# Patient Record
Sex: FEMALE | Race: WHITE | HISPANIC OR LATINO
[De-identification: names, ages, dates, MRNs, and addresses within clinical notes are randomized per-mention and may not be internally consistent; named-entity substitution may affect disease eponyms.]

---

## 2017-05-01 ENCOUNTER — TRANSCRIPTION ENCOUNTER (OUTPATIENT)
Age: 42
End: 2017-05-01

## 2017-12-10 ENCOUNTER — LABORATORY RESULT (OUTPATIENT)
Age: 42
End: 2017-12-10

## 2017-12-11 ENCOUNTER — APPOINTMENT (OUTPATIENT)
Dept: OBGYN | Facility: CLINIC | Age: 42
End: 2017-12-11
Payer: MEDICAID

## 2017-12-11 ENCOUNTER — OUTPATIENT (OUTPATIENT)
Dept: OUTPATIENT SERVICES | Facility: HOSPITAL | Age: 42
LOS: 1 days | End: 2017-12-11
Payer: MEDICAID

## 2017-12-11 VITALS
WEIGHT: 141 LBS | SYSTOLIC BLOOD PRESSURE: 130 MMHG | BODY MASS INDEX: 25.95 KG/M2 | DIASTOLIC BLOOD PRESSURE: 90 MMHG | HEIGHT: 62 IN

## 2017-12-11 DIAGNOSIS — R10.2 PELVIC AND PERINEAL PAIN: ICD-10-CM

## 2017-12-11 DIAGNOSIS — N76.0 ACUTE VAGINITIS: ICD-10-CM

## 2017-12-11 PROCEDURE — G0463: CPT

## 2017-12-11 PROCEDURE — 99213 OFFICE O/P EST LOW 20 MIN: CPT | Mod: GC

## 2017-12-11 RX ORDER — NORGESTIMATE AND ETHINYL ESTRADIOL 0.25-0.035
0.25-35 KIT ORAL DAILY
Qty: 90 | Refills: 3 | Status: DISCONTINUED | COMMUNITY
Start: 2017-12-11 | End: 2017-12-11

## 2017-12-12 ENCOUNTER — APPOINTMENT (OUTPATIENT)
Dept: MAMMOGRAPHY | Facility: IMAGING CENTER | Age: 42
End: 2017-12-12

## 2017-12-12 ENCOUNTER — APPOINTMENT (OUTPATIENT)
Dept: ULTRASOUND IMAGING | Facility: IMAGING CENTER | Age: 42
End: 2017-12-12

## 2017-12-12 LAB
C TRACH RRNA SPEC QL NAA+PROBE: SIGNIFICANT CHANGE UP
CANDIDA AB TITR SER: SIGNIFICANT CHANGE UP
G VAGINALIS DNA SPEC QL NAA+PROBE: DETECTED
N GONORRHOEA RRNA SPEC QL NAA+PROBE: SIGNIFICANT CHANGE UP
SPECIMEN SOURCE: SIGNIFICANT CHANGE UP
T VAGINALIS SPEC QL WET PREP: SIGNIFICANT CHANGE UP

## 2017-12-19 DIAGNOSIS — R10.2 PELVIC AND PERINEAL PAIN: ICD-10-CM

## 2017-12-20 ENCOUNTER — FORM ENCOUNTER (OUTPATIENT)
Age: 42
End: 2017-12-20

## 2017-12-21 ENCOUNTER — OUTPATIENT (OUTPATIENT)
Dept: OUTPATIENT SERVICES | Facility: HOSPITAL | Age: 42
LOS: 1 days | End: 2017-12-21
Payer: MEDICAID

## 2017-12-21 ENCOUNTER — APPOINTMENT (OUTPATIENT)
Dept: ULTRASOUND IMAGING | Facility: IMAGING CENTER | Age: 42
End: 2017-12-21
Payer: MEDICAID

## 2017-12-21 ENCOUNTER — APPOINTMENT (OUTPATIENT)
Dept: MAMMOGRAPHY | Facility: IMAGING CENTER | Age: 42
End: 2017-12-21
Payer: MEDICAID

## 2017-12-21 DIAGNOSIS — Z00.8 ENCOUNTER FOR OTHER GENERAL EXAMINATION: ICD-10-CM

## 2017-12-21 PROCEDURE — G0204: CPT | Mod: 26

## 2017-12-21 PROCEDURE — 76641 ULTRASOUND BREAST COMPLETE: CPT | Mod: 26,50

## 2017-12-21 PROCEDURE — G0279: CPT | Mod: 26

## 2017-12-21 PROCEDURE — 76641 ULTRASOUND BREAST COMPLETE: CPT

## 2017-12-21 PROCEDURE — 77066 DX MAMMO INCL CAD BI: CPT

## 2017-12-21 PROCEDURE — G0279: CPT

## 2017-12-26 ENCOUNTER — OTHER (OUTPATIENT)
Age: 42
End: 2017-12-26

## 2017-12-26 DIAGNOSIS — N63.10 UNSPECIFIED LUMP IN THE RIGHT BREAST, UNSPECIFIED QUADRANT: ICD-10-CM

## 2018-01-03 ENCOUNTER — FORM ENCOUNTER (OUTPATIENT)
Age: 43
End: 2018-01-03

## 2018-01-04 ENCOUNTER — RESULT REVIEW (OUTPATIENT)
Age: 43
End: 2018-01-04

## 2018-01-04 ENCOUNTER — APPOINTMENT (OUTPATIENT)
Dept: ULTRASOUND IMAGING | Facility: IMAGING CENTER | Age: 43
End: 2018-01-04
Payer: MEDICAID

## 2018-01-04 ENCOUNTER — OUTPATIENT (OUTPATIENT)
Dept: OUTPATIENT SERVICES | Facility: HOSPITAL | Age: 43
LOS: 1 days | End: 2018-01-04
Payer: MEDICAID

## 2018-01-04 DIAGNOSIS — N63.10 UNSPECIFIED LUMP IN THE RIGHT BREAST, UNSPECIFIED QUADRANT: ICD-10-CM

## 2018-01-04 PROCEDURE — A4648: CPT

## 2018-01-04 PROCEDURE — 77065 DX MAMMO INCL CAD UNI: CPT

## 2018-01-04 PROCEDURE — 19084 BX BREAST ADD LESION US IMAG: CPT

## 2018-01-04 PROCEDURE — 19083 BX BREAST 1ST LESION US IMAG: CPT

## 2018-01-04 PROCEDURE — 19084 BX BREAST ADD LESION US IMAG: CPT | Mod: RT

## 2018-01-04 PROCEDURE — 19083 BX BREAST 1ST LESION US IMAG: CPT | Mod: RT

## 2018-01-04 PROCEDURE — 77065 DX MAMMO INCL CAD UNI: CPT | Mod: 26,RT

## 2018-01-08 ENCOUNTER — MOBILE ON CALL (OUTPATIENT)
Age: 43
End: 2018-01-08

## 2018-01-30 ENCOUNTER — APPOINTMENT (OUTPATIENT)
Dept: SURGICAL ONCOLOGY | Facility: CLINIC | Age: 43
End: 2018-01-30
Payer: MEDICAID

## 2018-01-30 VITALS
SYSTOLIC BLOOD PRESSURE: 104 MMHG | OXYGEN SATURATION: 97 % | HEART RATE: 74 BPM | BODY MASS INDEX: 26.68 KG/M2 | HEIGHT: 62 IN | TEMPERATURE: 98 F | DIASTOLIC BLOOD PRESSURE: 65 MMHG | WEIGHT: 145 LBS

## 2018-01-30 PROCEDURE — 99204 OFFICE O/P NEW MOD 45 MIN: CPT

## 2018-02-08 ENCOUNTER — OUTPATIENT (OUTPATIENT)
Dept: OUTPATIENT SERVICES | Facility: HOSPITAL | Age: 43
LOS: 1 days | End: 2018-02-08
Payer: MEDICAID

## 2018-02-08 VITALS
WEIGHT: 143.96 LBS | RESPIRATION RATE: 16 BRPM | DIASTOLIC BLOOD PRESSURE: 72 MMHG | TEMPERATURE: 98 F | HEIGHT: 62 IN | SYSTOLIC BLOOD PRESSURE: 116 MMHG | OXYGEN SATURATION: 99 % | HEART RATE: 77 BPM

## 2018-02-08 DIAGNOSIS — Z98.890 OTHER SPECIFIED POSTPROCEDURAL STATES: Chronic | ICD-10-CM

## 2018-02-08 DIAGNOSIS — D24.1 BENIGN NEOPLASM OF RIGHT BREAST: ICD-10-CM

## 2018-02-08 DIAGNOSIS — Z01.818 ENCOUNTER FOR OTHER PREPROCEDURAL EXAMINATION: ICD-10-CM

## 2018-02-08 LAB
ANION GAP SERPL CALC-SCNC: 12 MMOL/L — SIGNIFICANT CHANGE UP (ref 5–17)
BUN SERPL-MCNC: 11 MG/DL — SIGNIFICANT CHANGE UP (ref 7–23)
CALCIUM SERPL-MCNC: 9 MG/DL — SIGNIFICANT CHANGE UP (ref 8.4–10.5)
CHLORIDE SERPL-SCNC: 107 MMOL/L — SIGNIFICANT CHANGE UP (ref 96–108)
CO2 SERPL-SCNC: 23 MMOL/L — SIGNIFICANT CHANGE UP (ref 22–31)
CREAT SERPL-MCNC: 0.59 MG/DL — SIGNIFICANT CHANGE UP (ref 0.5–1.3)
GLUCOSE SERPL-MCNC: 72 MG/DL — SIGNIFICANT CHANGE UP (ref 70–99)
HCT VFR BLD CALC: 39.6 % — SIGNIFICANT CHANGE UP (ref 34.5–45)
HGB BLD-MCNC: 13.2 G/DL — SIGNIFICANT CHANGE UP (ref 11.5–15.5)
MCHC RBC-ENTMCNC: 29.6 PG — SIGNIFICANT CHANGE UP (ref 27–34)
MCHC RBC-ENTMCNC: 33.3 GM/DL — SIGNIFICANT CHANGE UP (ref 32–36)
MCV RBC AUTO: 88.8 FL — SIGNIFICANT CHANGE UP (ref 80–100)
PLATELET # BLD AUTO: 290 K/UL — SIGNIFICANT CHANGE UP (ref 150–400)
POTASSIUM SERPL-MCNC: 4.5 MMOL/L — SIGNIFICANT CHANGE UP (ref 3.5–5.3)
POTASSIUM SERPL-SCNC: 4.5 MMOL/L — SIGNIFICANT CHANGE UP (ref 3.5–5.3)
RBC # BLD: 4.46 M/UL — SIGNIFICANT CHANGE UP (ref 3.8–5.2)
RBC # FLD: 13.1 % — SIGNIFICANT CHANGE UP (ref 10.3–14.5)
SODIUM SERPL-SCNC: 142 MMOL/L — SIGNIFICANT CHANGE UP (ref 135–145)
WBC # BLD: 5.89 K/UL — SIGNIFICANT CHANGE UP (ref 3.8–10.5)
WBC # FLD AUTO: 5.89 K/UL — SIGNIFICANT CHANGE UP (ref 3.8–10.5)

## 2018-02-08 PROCEDURE — G0463: CPT

## 2018-02-08 PROCEDURE — 85027 COMPLETE CBC AUTOMATED: CPT

## 2018-02-08 PROCEDURE — 80048 BASIC METABOLIC PNL TOTAL CA: CPT

## 2018-02-08 RX ORDER — LIDOCAINE HCL 20 MG/ML
0.2 VIAL (ML) INJECTION ONCE
Qty: 0 | Refills: 0 | Status: DISCONTINUED | OUTPATIENT
Start: 2018-02-14 | End: 2018-03-01

## 2018-02-08 RX ORDER — APREPITANT 80 MG/1
40 CAPSULE ORAL ONCE
Qty: 0 | Refills: 0 | Status: COMPLETED | OUTPATIENT
Start: 2018-02-14 | End: 2018-02-14

## 2018-02-08 RX ORDER — ACETAMINOPHEN 500 MG
1000 TABLET ORAL ONCE
Qty: 0 | Refills: 0 | Status: COMPLETED | OUTPATIENT
Start: 2018-02-14 | End: 2018-02-14

## 2018-02-08 RX ORDER — SODIUM CHLORIDE 9 MG/ML
3 INJECTION INTRAMUSCULAR; INTRAVENOUS; SUBCUTANEOUS EVERY 8 HOURS
Qty: 0 | Refills: 0 | Status: DISCONTINUED | OUTPATIENT
Start: 2018-02-14 | End: 2018-03-01

## 2018-02-08 NOTE — H&P PST ADULT - HISTORY OF PRESENT ILLNESS
This is a 43 y/o female with no significant PMH. Current dx: Right Breast Intraductal Papilloma. Scheduled: Right Breast Excisional Biopsy Post VELVET  Local.

## 2018-02-08 NOTE — H&P PST ADULT - PMH
Borderline hypercholesterolemia  Diet-managed  Migraine    Multiparity Borderline hypercholesterolemia  Diet-managed  Intraductal papilloma of breast, right  dx: 12/2017  Migraine    Multiparity

## 2018-02-08 NOTE — H&P PST ADULT - ATTENDING COMMENTS
Risks, benefits, and alternatives discussed with patient.  She expressed understanding and agrees to proceed with right breast excisional biopsy with VELVET  localization.

## 2018-02-08 NOTE — H&P PST ADULT - NSANTHOSAYNRD_GEN_A_CORE
No. DOMINGUEZ screening performed.  STOP BANG Legend: 0-2 = LOW Risk; 3-4 = INTERMEDIATE Risk; 5-8 = HIGH Risk

## 2018-02-11 ENCOUNTER — FORM ENCOUNTER (OUTPATIENT)
Age: 43
End: 2018-02-11

## 2018-02-12 ENCOUNTER — OUTPATIENT (OUTPATIENT)
Dept: OUTPATIENT SERVICES | Facility: HOSPITAL | Age: 43
LOS: 1 days | End: 2018-02-12
Payer: MEDICAID

## 2018-02-12 ENCOUNTER — APPOINTMENT (OUTPATIENT)
Dept: ULTRASOUND IMAGING | Facility: IMAGING CENTER | Age: 43
End: 2018-02-12
Payer: MEDICAID

## 2018-02-12 DIAGNOSIS — Z98.890 OTHER SPECIFIED POSTPROCEDURAL STATES: Chronic | ICD-10-CM

## 2018-02-12 DIAGNOSIS — Z00.8 ENCOUNTER FOR OTHER GENERAL EXAMINATION: ICD-10-CM

## 2018-02-12 PROCEDURE — 19285 PERQ DEV BREAST 1ST US IMAG: CPT | Mod: RT

## 2018-02-12 PROCEDURE — C1739: CPT

## 2018-02-12 PROCEDURE — 19285 PERQ DEV BREAST 1ST US IMAG: CPT

## 2018-02-13 ENCOUNTER — FORM ENCOUNTER (OUTPATIENT)
Age: 43
End: 2018-02-13

## 2018-02-14 ENCOUNTER — RESULT REVIEW (OUTPATIENT)
Age: 43
End: 2018-02-14

## 2018-02-14 ENCOUNTER — OUTPATIENT (OUTPATIENT)
Dept: OUTPATIENT SERVICES | Facility: HOSPITAL | Age: 43
LOS: 1 days | End: 2018-02-14
Payer: MEDICAID

## 2018-02-14 ENCOUNTER — TRANSCRIPTION ENCOUNTER (OUTPATIENT)
Age: 43
End: 2018-02-14

## 2018-02-14 ENCOUNTER — APPOINTMENT (OUTPATIENT)
Dept: SURGICAL ONCOLOGY | Facility: HOSPITAL | Age: 43
End: 2018-02-14

## 2018-02-14 VITALS
DIASTOLIC BLOOD PRESSURE: 67 MMHG | HEART RATE: 76 BPM | SYSTOLIC BLOOD PRESSURE: 115 MMHG | OXYGEN SATURATION: 97 % | TEMPERATURE: 98 F | RESPIRATION RATE: 18 BRPM

## 2018-02-14 VITALS
RESPIRATION RATE: 16 BRPM | HEIGHT: 62 IN | HEART RATE: 77 BPM | WEIGHT: 143.96 LBS | SYSTOLIC BLOOD PRESSURE: 108 MMHG | TEMPERATURE: 98 F | DIASTOLIC BLOOD PRESSURE: 70 MMHG | OXYGEN SATURATION: 98 %

## 2018-02-14 DIAGNOSIS — G89.18 OTHER ACUTE POSTPROCEDURAL PAIN: ICD-10-CM

## 2018-02-14 DIAGNOSIS — Z98.890 OTHER SPECIFIED POSTPROCEDURAL STATES: Chronic | ICD-10-CM

## 2018-02-14 DIAGNOSIS — D24.1 BENIGN NEOPLASM OF RIGHT BREAST: ICD-10-CM

## 2018-02-14 PROCEDURE — 19281 PERQ DEVICE BREAST 1ST IMAG: CPT

## 2018-02-14 PROCEDURE — 19366: CPT | Mod: 59

## 2018-02-14 PROCEDURE — 76098 X-RAY EXAM SURGICAL SPECIMEN: CPT

## 2018-02-14 PROCEDURE — 88307 TISSUE EXAM BY PATHOLOGIST: CPT

## 2018-02-14 PROCEDURE — 76098 X-RAY EXAM SURGICAL SPECIMEN: CPT | Mod: 26

## 2018-02-14 PROCEDURE — 19125 EXCISION BREAST LESION: CPT

## 2018-02-14 PROCEDURE — 88307 TISSUE EXAM BY PATHOLOGIST: CPT | Mod: 26

## 2018-02-14 PROCEDURE — 19125 EXCISION BREAST LESION: CPT | Mod: RT

## 2018-02-14 RX ORDER — CELECOXIB 200 MG/1
200 CAPSULE ORAL ONCE
Qty: 0 | Refills: 0 | Status: COMPLETED | OUTPATIENT
Start: 2018-02-14 | End: 2018-02-14

## 2018-02-14 RX ORDER — CELECOXIB 200 MG/1
200 CAPSULE ORAL ONCE
Qty: 0 | Refills: 0 | Status: DISCONTINUED | OUTPATIENT
Start: 2018-02-14 | End: 2018-03-01

## 2018-02-14 RX ORDER — ONDANSETRON 8 MG/1
4 TABLET, FILM COATED ORAL ONCE
Qty: 0 | Refills: 0 | Status: DISCONTINUED | OUTPATIENT
Start: 2018-02-14 | End: 2018-03-01

## 2018-02-14 RX ORDER — OXYCODONE HYDROCHLORIDE 5 MG/1
10 TABLET ORAL ONCE
Qty: 0 | Refills: 0 | Status: DISCONTINUED | OUTPATIENT
Start: 2018-02-14 | End: 2018-02-14

## 2018-02-14 RX ADMIN — Medication 1000 MILLIGRAM(S): at 06:24

## 2018-02-14 RX ADMIN — APREPITANT 40 MILLIGRAM(S): 80 CAPSULE ORAL at 06:24

## 2018-02-14 RX ADMIN — CELECOXIB 200 MILLIGRAM(S): 200 CAPSULE ORAL at 06:24

## 2018-02-14 NOTE — BRIEF OPERATIVE NOTE - OPERATION/FINDINGS
Excisional biopsy with sly  localization of right breast mass, preoperative core needle biopsy showing intraductal papilloma

## 2018-02-14 NOTE — BRIEF OPERATIVE NOTE - PROCEDURE
<<-----Click on this checkbox to enter Procedure Excisional biopsy of right breast  02/14/2018    Active  [id number]

## 2018-02-14 NOTE — ASU PATIENT PROFILE, ADULT - PMH
Borderline hypercholesterolemia  Diet-managed  Intraductal papilloma of breast, right  dx: 12/2017  Migraine    Multiparity

## 2018-02-14 NOTE — ASU DISCHARGE PLAN (ADULT/PEDIATRIC). - ITEMS TO FOLLOWUP WITH YOUR PHYSICIAN'S
Please follow up with Dr. Ovalles in 2 weeks. You can call his office at [phone].  Take tylenol and motrin for pain.  You can shower tomorrow. No tub baths.  Wear surgical bra for 48 hours. After than you can return to wearing your regular bra.

## 2018-03-01 ENCOUNTER — APPOINTMENT (OUTPATIENT)
Dept: SURGICAL ONCOLOGY | Facility: CLINIC | Age: 43
End: 2018-03-01
Payer: MEDICAID

## 2018-03-01 VITALS
OXYGEN SATURATION: 100 % | TEMPERATURE: 98 F | DIASTOLIC BLOOD PRESSURE: 73 MMHG | SYSTOLIC BLOOD PRESSURE: 137 MMHG | BODY MASS INDEX: 26.87 KG/M2 | HEART RATE: 69 BPM | WEIGHT: 146 LBS | HEIGHT: 62 IN

## 2018-03-01 DIAGNOSIS — D24.1 BENIGN NEOPLASM OF RIGHT BREAST: ICD-10-CM

## 2018-03-01 PROCEDURE — 99024 POSTOP FOLLOW-UP VISIT: CPT

## 2018-11-01 ENCOUNTER — TRANSCRIPTION ENCOUNTER (OUTPATIENT)
Age: 43
End: 2018-11-01

## 2019-01-02 NOTE — PRE-ANESTHESIA EVALUATION ADULT - HEIGHT IN FEET
5 Cheek-To-Nose Interpolation Flap Text: A decision was made to reconstruct the defect utilizing an interpolation axial flap and a staged reconstruction.  A telfa template was made of the defect.  This telfa template was then used to outline the Cheek-To-Nose Interpolation flap.  The donor area for the pedicle flap was then injected with anesthesia.  The flap was excised through the skin and subcutaneous tissue down to the layer of the underlying musculature.  The interpolation flap was carefully excised within this deep plane to maintain its blood supply.  The edges of the donor site were undermined.   The donor site was closed in a primary fashion.  The pedicle was then rotated into position and sutured.  Once the tube was sutured into place, adequate blood supply was confirmed with blanching and refill.  The pedicle was then wrapped with xeroform gauze and dressed appropriately with a telfa and gauze bandage to ensure continued blood supply and protect the attached pedicle.

## 2019-04-03 NOTE — ASU PATIENT PROFILE, ADULT - ANESTHESIA, PREVIOUS REACTION, PROFILE
nausea/vomiting
left sentinel lymph nodes x2, left breast bracketed needle localized mass of two areas, left breast anterior, medial, lateral and inferior surgical margins.

## 2019-11-18 NOTE — ASU DISCHARGE PLAN (ADULT/PEDIATRIC). - DRIVING
Patient is requesting a call back to discuss if it is alright for her to use tampons due to her recent procedure. Patient is aware that Dr. Adler is out of the office, and is requesting a call back from the on-call nurse to discuss.       Yes

## 2020-04-02 NOTE — ASU PATIENT PROFILE, ADULT - ABILITY TO HEAR (WITH HEARING AID OR HEARING APPLIANCE IF NORMALLY USED):
Adequate: hears normal conversation without difficulty ,[email or web address]

## 2020-09-10 PROBLEM — D24.1 BENIGN NEOPLASM OF RIGHT BREAST: Chronic | Status: ACTIVE | Noted: 2018-02-08

## 2020-09-10 PROBLEM — E78.00 PURE HYPERCHOLESTEROLEMIA, UNSPECIFIED: Chronic | Status: ACTIVE | Noted: 2018-02-08

## 2020-09-10 PROBLEM — G43.909 MIGRAINE, UNSPECIFIED, NOT INTRACTABLE, WITHOUT STATUS MIGRAINOSUS: Chronic | Status: ACTIVE | Noted: 2018-02-08

## 2020-09-17 ENCOUNTER — LABORATORY RESULT (OUTPATIENT)
Age: 45
End: 2020-09-17

## 2020-09-17 ENCOUNTER — APPOINTMENT (OUTPATIENT)
Dept: OBGYN | Facility: CLINIC | Age: 45
End: 2020-09-17
Payer: MEDICAID

## 2020-09-17 ENCOUNTER — OUTPATIENT (OUTPATIENT)
Dept: OUTPATIENT SERVICES | Facility: HOSPITAL | Age: 45
LOS: 1 days | End: 2020-09-17
Payer: COMMERCIAL

## 2020-09-17 VITALS — SYSTOLIC BLOOD PRESSURE: 120 MMHG | BODY MASS INDEX: 25.88 KG/M2 | WEIGHT: 141.5 LBS | DIASTOLIC BLOOD PRESSURE: 78 MMHG

## 2020-09-17 DIAGNOSIS — Z98.890 OTHER SPECIFIED POSTPROCEDURAL STATES: Chronic | ICD-10-CM

## 2020-09-17 DIAGNOSIS — A56.09 OTHER CHLAMYDIAL INFECTION OF LOWER GENITOURINARY TRACT: ICD-10-CM

## 2020-09-17 DIAGNOSIS — Z83.3 FAMILY HISTORY OF DIABETES MELLITUS: ICD-10-CM

## 2020-09-17 DIAGNOSIS — Z87.891 PERSONAL HISTORY OF NICOTINE DEPENDENCE: ICD-10-CM

## 2020-09-17 DIAGNOSIS — Z86.39 PERSONAL HISTORY OF OTHER ENDOCRINE, NUTRITIONAL AND METABOLIC DISEASE: ICD-10-CM

## 2020-09-17 DIAGNOSIS — Z87.59 PERSONAL HISTORY OF OTHER COMPLICATIONS OF PREGNANCY, CHILDBIRTH AND THE PUERPERIUM: ICD-10-CM

## 2020-09-17 DIAGNOSIS — Z86.018 PERSONAL HISTORY OF OTHER BENIGN NEOPLASM: ICD-10-CM

## 2020-09-17 DIAGNOSIS — N76.0 ACUTE VAGINITIS: ICD-10-CM

## 2020-09-17 PROCEDURE — G0463: CPT

## 2020-09-17 PROCEDURE — 88175 CYTOPATH C/V AUTO FLUID REDO: CPT

## 2020-09-17 PROCEDURE — 99396 PREV VISIT EST AGE 40-64: CPT | Mod: NC

## 2020-09-17 NOTE — PROCEDURE
[Cervical Pap Smear] : cervical Pap smear [Liquid Base] : liquid base [GC Chlamydia Culture] : GC Chlamydia Culture [Tolerated Well] : the patient tolerated the procedure well [No Complications] : there were no complications [Endometrial Biopsy] : Endometrial biopsy [Irregular Bleeding] : irregular uterine bleeding [Risks] : risks [Benefits] : benefits [Alternatives] : alternatives [Patient] : patient [CONSENT OBTAINED] : written consent was obtained prior to the procedure. [LMP ___] : LMP was [unfilled] [Betadine] : Betadine [None] : none

## 2020-09-20 NOTE — HISTORY OF PRESENT ILLNESS
[Good] : being in good health [Last Mammogram ___] : Last Mammogram was [unfilled] [Last Pap ___] : Last cervical pap smear was [unfilled] [___ Year(s) Ago] : [unfilled] year(s) ago [Reproductive Age] : is of reproductive age [Up to Date] : up to date with ~his/her~ STD screening [Last Screen ___] : last STD screen was [unfilled] [Irregular Menses] : irregular menses [Heavy Bleeding] : described as heavy in severity [Bleeding] : bleeding [Fibroids] : fibroids [Definite:  ___ (Date)] : the last menstrual period was [unfilled] [Normal Amount/Duration] : was of a normal amount and duration [Prior Menses:  ___ Date] : date of prior menstruation was [unfilled] [Menarche Age: ____] : age at menarche was [unfilled] [Spotting Between  Menses] : spotting reported between menses [Sexually Active] : is sexually active [Monogamous] : is monogamous [Pain] : no pelvic pain [Dizziness] : no dizziness [Palpitations] : no palpitations [Menopausal Symptoms] : no manopausal symptoms [Pregnancy] : no pregnancy [FreeTextEntry8] : intermittent x 1.5 months.  [FreeTextEntry2] : pelvic pressure [Regular Cycle Intervals] : periods have been irregular

## 2020-09-20 NOTE — REVIEW OF SYSTEMS
[Incontinence] : incontinence [Abn Vag Bleeding] : abnormal vaginal bleeding [Headache] : headache [Nl] : Integumentary [Dysuria] : no dysuria [Frequency] : no frequency [Urgency] : no urgency [Urethral Discharge] : no abnormal urethral discharge [Convulsions] : no convulsions [Dizziness] : no dizziness [Fainting] : no fainting

## 2020-09-22 LAB — CYTOLOGY SPEC DOC CYTO: SIGNIFICANT CHANGE UP

## 2020-09-23 DIAGNOSIS — Z00.00 ENCOUNTER FOR GENERAL ADULT MEDICAL EXAMINATION WITHOUT ABNORMAL FINDINGS: ICD-10-CM

## 2020-09-30 ENCOUNTER — APPOINTMENT (OUTPATIENT)
Dept: ULTRASOUND IMAGING | Facility: CLINIC | Age: 45
End: 2020-09-30

## 2020-09-30 ENCOUNTER — APPOINTMENT (OUTPATIENT)
Dept: ULTRASOUND IMAGING | Facility: IMAGING CENTER | Age: 45
End: 2020-09-30

## 2020-09-30 ENCOUNTER — APPOINTMENT (OUTPATIENT)
Dept: MAMMOGRAPHY | Facility: IMAGING CENTER | Age: 45
End: 2020-09-30

## 2020-10-06 ENCOUNTER — RESULT REVIEW (OUTPATIENT)
Age: 45
End: 2020-10-06

## 2020-10-06 ENCOUNTER — APPOINTMENT (OUTPATIENT)
Dept: ULTRASOUND IMAGING | Facility: IMAGING CENTER | Age: 45
End: 2020-10-06
Payer: MEDICAID

## 2020-10-06 ENCOUNTER — APPOINTMENT (OUTPATIENT)
Dept: MAMMOGRAPHY | Facility: IMAGING CENTER | Age: 45
End: 2020-10-06
Payer: MEDICAID

## 2020-10-06 ENCOUNTER — APPOINTMENT (OUTPATIENT)
Dept: ULTRASOUND IMAGING | Facility: CLINIC | Age: 45
End: 2020-10-06

## 2020-10-06 ENCOUNTER — OUTPATIENT (OUTPATIENT)
Dept: OUTPATIENT SERVICES | Facility: HOSPITAL | Age: 45
LOS: 1 days | End: 2020-10-06
Payer: COMMERCIAL

## 2020-10-06 DIAGNOSIS — D24.1 BENIGN NEOPLASM OF RIGHT BREAST: ICD-10-CM

## 2020-10-06 DIAGNOSIS — Z98.890 OTHER SPECIFIED POSTPROCEDURAL STATES: Chronic | ICD-10-CM

## 2020-10-06 PROCEDURE — 76641 ULTRASOUND BREAST COMPLETE: CPT | Mod: 26,50

## 2020-10-06 PROCEDURE — G0279: CPT | Mod: 26

## 2020-10-06 PROCEDURE — 77066 DX MAMMO INCL CAD BI: CPT

## 2020-10-06 PROCEDURE — G0279: CPT

## 2020-10-06 PROCEDURE — 77066 DX MAMMO INCL CAD BI: CPT | Mod: 26

## 2020-10-06 PROCEDURE — 76641 ULTRASOUND BREAST COMPLETE: CPT

## 2020-10-08 ENCOUNTER — RESULT REVIEW (OUTPATIENT)
Age: 45
End: 2020-10-08

## 2020-10-08 ENCOUNTER — APPOINTMENT (OUTPATIENT)
Dept: ULTRASOUND IMAGING | Facility: CLINIC | Age: 45
End: 2020-10-08
Payer: MEDICAID

## 2020-10-08 ENCOUNTER — OUTPATIENT (OUTPATIENT)
Dept: OUTPATIENT SERVICES | Facility: HOSPITAL | Age: 45
LOS: 1 days | End: 2020-10-08
Payer: COMMERCIAL

## 2020-10-08 DIAGNOSIS — Z00.8 ENCOUNTER FOR OTHER GENERAL EXAMINATION: ICD-10-CM

## 2020-10-08 DIAGNOSIS — Z98.890 OTHER SPECIFIED POSTPROCEDURAL STATES: Chronic | ICD-10-CM

## 2020-10-08 PROCEDURE — 76856 US EXAM PELVIC COMPLETE: CPT | Mod: 26

## 2020-10-08 PROCEDURE — 76856 US EXAM PELVIC COMPLETE: CPT

## 2020-10-26 ENCOUNTER — APPOINTMENT (OUTPATIENT)
Dept: OBGYN | Facility: CLINIC | Age: 45
End: 2020-10-26
Payer: MEDICAID

## 2020-10-26 ENCOUNTER — OUTPATIENT (OUTPATIENT)
Dept: OUTPATIENT SERVICES | Facility: HOSPITAL | Age: 45
LOS: 1 days | End: 2020-10-26
Payer: COMMERCIAL

## 2020-10-26 VITALS — WEIGHT: 143 LBS | BODY MASS INDEX: 26.16 KG/M2 | SYSTOLIC BLOOD PRESSURE: 130 MMHG | DIASTOLIC BLOOD PRESSURE: 80 MMHG

## 2020-10-26 DIAGNOSIS — N76.0 ACUTE VAGINITIS: ICD-10-CM

## 2020-10-26 DIAGNOSIS — Z98.890 OTHER SPECIFIED POSTPROCEDURAL STATES: Chronic | ICD-10-CM

## 2020-10-26 PROCEDURE — G0463: CPT

## 2020-10-26 PROCEDURE — 36415 COLL VENOUS BLD VENIPUNCTURE: CPT

## 2020-10-26 PROCEDURE — 36415 COLL VENOUS BLD VENIPUNCTURE: CPT | Mod: NC

## 2020-10-26 PROCEDURE — 99213 OFFICE O/P EST LOW 20 MIN: CPT | Mod: NC

## 2020-10-26 NOTE — DISCUSSION/SUMMARY
[FreeTextEntry1] : 45 y/o    AUB  \par # AUB- S/p EMBX -  Fragments of Polyp  noted sonohyst ordered  \par CBC sent  \par TSh sent  \par PAp wnl  Gc/chl neg  \par Pt will calll  for sonohyst result if  POlyp present then schedule D&C hysteroscopy  \par # S/p Trich  Tx 20  \par Will f/u  post sonohyst-  full Std screen and CHRIS  to be done  \par \par B. Laura PAC  \par

## 2020-11-02 DIAGNOSIS — N93.9 ABNORMAL UTERINE AND VAGINAL BLEEDING, UNSPECIFIED: ICD-10-CM

## 2020-11-02 DIAGNOSIS — N84.0 POLYP OF CORPUS UTERI: ICD-10-CM

## 2020-12-10 ENCOUNTER — OUTPATIENT (OUTPATIENT)
Dept: OUTPATIENT SERVICES | Facility: HOSPITAL | Age: 45
LOS: 1 days | End: 2020-12-10
Payer: COMMERCIAL

## 2020-12-10 ENCOUNTER — APPOINTMENT (OUTPATIENT)
Dept: OBGYN | Facility: CLINIC | Age: 45
End: 2020-12-10
Payer: MEDICAID

## 2020-12-10 ENCOUNTER — LABORATORY RESULT (OUTPATIENT)
Age: 45
End: 2020-12-10

## 2020-12-10 VITALS — TEMPERATURE: 96.4 F

## 2020-12-10 VITALS — TEMPERATURE: 96.6 F

## 2020-12-10 DIAGNOSIS — A59.9 TRICHOMONIASIS, UNSPECIFIED: ICD-10-CM

## 2020-12-10 DIAGNOSIS — Z98.890 OTHER SPECIFIED POSTPROCEDURAL STATES: Chronic | ICD-10-CM

## 2020-12-10 DIAGNOSIS — N76.0 ACUTE VAGINITIS: ICD-10-CM

## 2020-12-10 DIAGNOSIS — N93.9 ABNORMAL UTERINE AND VAGINAL BLEEDING, UNSPECIFIED: ICD-10-CM

## 2020-12-10 PROCEDURE — G0463: CPT

## 2020-12-10 PROCEDURE — 99214 OFFICE O/P EST MOD 30 MIN: CPT | Mod: NC

## 2020-12-10 PROCEDURE — 87800 DETECT AGNT MULT DNA DIREC: CPT

## 2020-12-11 LAB
CANDIDA AB TITR SER: SIGNIFICANT CHANGE UP
G VAGINALIS DNA SPEC QL NAA+PROBE: DETECTED
T VAGINALIS SPEC QL WET PREP: SIGNIFICANT CHANGE UP

## 2020-12-11 NOTE — HISTORY OF PRESENT ILLNESS
[FreeTextEntry1] :  S/p EMBX  fragments  of polyp  noted  on B X\par  has not has sono hyst to date  \par LMP 20  \par S/p Tx for TRICH  - pt and partner desires CHRIS

## 2020-12-11 NOTE — PHYSICAL EXAM
[Labia Majora] : normal [Labia Minora] : normal [Scant] : There was scant vaginal bleeding [Normal] : normal [Uterine Adnexae] : normal [Nl Sphincter Tone] : normal sphincter tone [Tenderness] : nontender [Enlarged ___ wks] : not enlarged [Mass ___ cm] : no uterine mass was palpated

## 2020-12-11 NOTE — DISCUSSION/SUMMARY
[FreeTextEntry1] : 46 y/o \par # AUB - s/p EMBX - fragments of polyp awating sonohyst to confirm  \par #  Contraception - D/w pt use of Mirean  if  polyp is present  to be placed @ time of Polypectomy  or to be placed w/ Menses  \par #  TRich - desires CHRIS tx in 2020  ful STD screen neg  2020 \par PAp wnl  - 2020  \par MAmmo  - 10/2020 Birads 2  \par \par B. Laura PAC  \par

## 2020-12-14 DIAGNOSIS — B96.89 ACUTE VAGINITIS: ICD-10-CM

## 2020-12-14 DIAGNOSIS — N76.0 ACUTE VAGINITIS: ICD-10-CM

## 2020-12-22 ENCOUNTER — EMERGENCY (EMERGENCY)
Facility: HOSPITAL | Age: 45
LOS: 1 days | Discharge: HOME | End: 2020-12-22
Attending: EMERGENCY MEDICINE
Payer: COMMERCIAL

## 2020-12-22 VITALS
DIASTOLIC BLOOD PRESSURE: 85 MMHG | HEART RATE: 88 BPM | SYSTOLIC BLOOD PRESSURE: 159 MMHG | HEIGHT: 62 IN | WEIGHT: 134.92 LBS | OXYGEN SATURATION: 100 % | TEMPERATURE: 98 F | RESPIRATION RATE: 18 BRPM

## 2020-12-22 DIAGNOSIS — Z98.890 OTHER SPECIFIED POSTPROCEDURAL STATES: Chronic | ICD-10-CM

## 2020-12-22 LAB
ALBUMIN SERPL ELPH-MCNC: 4.8 G/DL — SIGNIFICANT CHANGE UP (ref 3.3–5)
ALP SERPL-CCNC: 73 U/L — SIGNIFICANT CHANGE UP (ref 40–120)
ALT FLD-CCNC: 17 U/L — SIGNIFICANT CHANGE UP (ref 10–45)
ANION GAP SERPL CALC-SCNC: 13 MMOL/L — SIGNIFICANT CHANGE UP (ref 5–17)
AST SERPL-CCNC: 19 U/L — SIGNIFICANT CHANGE UP (ref 10–40)
BASOPHILS # BLD AUTO: 0.08 K/UL — SIGNIFICANT CHANGE UP (ref 0–0.2)
BASOPHILS NFR BLD AUTO: 0.7 % — SIGNIFICANT CHANGE UP (ref 0–2)
BILIRUB SERPL-MCNC: 0.2 MG/DL — SIGNIFICANT CHANGE UP (ref 0.2–1.2)
BUN SERPL-MCNC: 15 MG/DL — SIGNIFICANT CHANGE UP (ref 7–23)
CALCIUM SERPL-MCNC: 10 MG/DL — SIGNIFICANT CHANGE UP (ref 8.4–10.5)
CHLORIDE SERPL-SCNC: 104 MMOL/L — SIGNIFICANT CHANGE UP (ref 96–108)
CO2 SERPL-SCNC: 22 MMOL/L — SIGNIFICANT CHANGE UP (ref 22–31)
CREAT SERPL-MCNC: 0.69 MG/DL — SIGNIFICANT CHANGE UP (ref 0.5–1.3)
EOSINOPHIL # BLD AUTO: 0.12 K/UL — SIGNIFICANT CHANGE UP (ref 0–0.5)
EOSINOPHIL NFR BLD AUTO: 1.1 % — SIGNIFICANT CHANGE UP (ref 0–6)
GLUCOSE SERPL-MCNC: 112 MG/DL — HIGH (ref 70–99)
HCT VFR BLD CALC: 39.3 % — SIGNIFICANT CHANGE UP (ref 34.5–45)
HGB BLD-MCNC: 12.4 G/DL — SIGNIFICANT CHANGE UP (ref 11.5–15.5)
IMM GRANULOCYTES NFR BLD AUTO: 0.5 % — SIGNIFICANT CHANGE UP (ref 0–1.5)
LIDOCAIN IGE QN: 39 U/L — SIGNIFICANT CHANGE UP (ref 7–60)
LYMPHOCYTES # BLD AUTO: 1.51 K/UL — SIGNIFICANT CHANGE UP (ref 1–3.3)
LYMPHOCYTES # BLD AUTO: 13.3 % — SIGNIFICANT CHANGE UP (ref 13–44)
MCHC RBC-ENTMCNC: 26.4 PG — LOW (ref 27–34)
MCHC RBC-ENTMCNC: 31.6 GM/DL — LOW (ref 32–36)
MCV RBC AUTO: 83.6 FL — SIGNIFICANT CHANGE UP (ref 80–100)
MONOCYTES # BLD AUTO: 0.62 K/UL — SIGNIFICANT CHANGE UP (ref 0–0.9)
MONOCYTES NFR BLD AUTO: 5.5 % — SIGNIFICANT CHANGE UP (ref 2–14)
NEUTROPHILS # BLD AUTO: 8.97 K/UL — HIGH (ref 1.8–7.4)
NEUTROPHILS NFR BLD AUTO: 78.9 % — HIGH (ref 43–77)
NRBC # BLD: 0 /100 WBCS — SIGNIFICANT CHANGE UP (ref 0–0)
PLATELET # BLD AUTO: 342 K/UL — SIGNIFICANT CHANGE UP (ref 150–400)
POTASSIUM SERPL-MCNC: 3.7 MMOL/L — SIGNIFICANT CHANGE UP (ref 3.5–5.3)
POTASSIUM SERPL-SCNC: 3.7 MMOL/L — SIGNIFICANT CHANGE UP (ref 3.5–5.3)
PROT SERPL-MCNC: 7.9 G/DL — SIGNIFICANT CHANGE UP (ref 6–8.3)
RBC # BLD: 4.7 M/UL — SIGNIFICANT CHANGE UP (ref 3.8–5.2)
RBC # FLD: 12.5 % — SIGNIFICANT CHANGE UP (ref 10.3–14.5)
SODIUM SERPL-SCNC: 139 MMOL/L — SIGNIFICANT CHANGE UP (ref 135–145)
TROPONIN T, HIGH SENSITIVITY RESULT: <6 NG/L — SIGNIFICANT CHANGE UP (ref 0–51)
WBC # BLD: 11.36 K/UL — HIGH (ref 3.8–10.5)
WBC # FLD AUTO: 11.36 K/UL — HIGH (ref 3.8–10.5)

## 2020-12-22 PROCEDURE — 93010 ELECTROCARDIOGRAM REPORT: CPT

## 2020-12-22 PROCEDURE — 99285 EMERGENCY DEPT VISIT HI MDM: CPT

## 2020-12-22 PROCEDURE — 71046 X-RAY EXAM CHEST 2 VIEWS: CPT

## 2020-12-22 PROCEDURE — 96374 THER/PROPH/DIAG INJ IV PUSH: CPT

## 2020-12-22 PROCEDURE — 93005 ELECTROCARDIOGRAM TRACING: CPT

## 2020-12-22 PROCEDURE — 85025 COMPLETE CBC W/AUTO DIFF WBC: CPT

## 2020-12-22 PROCEDURE — 84484 ASSAY OF TROPONIN QUANT: CPT

## 2020-12-22 PROCEDURE — 96375 TX/PRO/DX INJ NEW DRUG ADDON: CPT

## 2020-12-22 PROCEDURE — 99284 EMERGENCY DEPT VISIT MOD MDM: CPT | Mod: 25

## 2020-12-22 PROCEDURE — 80053 COMPREHEN METABOLIC PANEL: CPT

## 2020-12-22 PROCEDURE — 83690 ASSAY OF LIPASE: CPT

## 2020-12-22 PROCEDURE — 71046 X-RAY EXAM CHEST 2 VIEWS: CPT | Mod: 26

## 2020-12-22 RX ORDER — SODIUM CHLORIDE 9 MG/ML
1000 INJECTION INTRAMUSCULAR; INTRAVENOUS; SUBCUTANEOUS ONCE
Refills: 0 | Status: COMPLETED | OUTPATIENT
Start: 2020-12-22 | End: 2020-12-22

## 2020-12-22 RX ORDER — FAMOTIDINE 10 MG/ML
20 INJECTION INTRAVENOUS ONCE
Refills: 0 | Status: COMPLETED | OUTPATIENT
Start: 2020-12-22 | End: 2020-12-22

## 2020-12-22 RX ORDER — ONDANSETRON 8 MG/1
4 TABLET, FILM COATED ORAL ONCE
Refills: 0 | Status: COMPLETED | OUTPATIENT
Start: 2020-12-22 | End: 2020-12-22

## 2020-12-22 RX ADMIN — ONDANSETRON 4 MILLIGRAM(S): 8 TABLET, FILM COATED ORAL at 22:25

## 2020-12-22 RX ADMIN — SODIUM CHLORIDE 1000 MILLILITER(S): 9 INJECTION INTRAMUSCULAR; INTRAVENOUS; SUBCUTANEOUS at 22:25

## 2020-12-22 RX ADMIN — FAMOTIDINE 20 MILLIGRAM(S): 10 INJECTION INTRAVENOUS at 22:25

## 2020-12-22 RX ADMIN — Medication 30 MILLILITER(S): at 22:25

## 2020-12-22 NOTE — ED ADULT NURSE NOTE - OBJECTIVE STATEMENT
45 year old female coming in for chest discomfort. Pt states today around 1630  while she was working and not doing any extraneous activity, The pain began gradual in her epigastric area and then moved up to her left side of her chest. Pain is associated with nausea and multiple episodes of vomiting.  with Pt denies any palpation or pressure. Pt denies any SOB or DUMONT. No PMH. Pt states shes had epigastric discomfort before but it has never been this bad, it usually just goes away. Pt has been on a 6 day course of flygly for BV. No COVId or sick contacts. No fever, chills, or body ahces.

## 2020-12-22 NOTE — ED PROVIDER NOTE - NSFOLLOWUPINSTRUCTIONS_ED_ALL_ED_FT
Take pepcid 20mg 1x daily for stomach acid.   Take Maalox 30mL (over the counter) for stomach acid / pain.     Follow-up with Gastroenterology (GI) in 1 week for further evaluation of your abdominal pain.     Return to the ED for any worsening pain, chest pain, difficulty breathing or any new concerning symptoms.

## 2020-12-22 NOTE — ED PROVIDER NOTE - PHYSICAL EXAMINATION
Gen: well appearing female   HEENT: NCAT, EOMI, no nasal discharge, mucous membranes moist  CV: RRR, +S1/S2, no M/R/G  Resp: CTAB, no W/R/R  GI: Abdomen soft non-distended, NTTP, no masses  MSK: No open wounds, no bruising, no LE edema  Neuro: A&Ox4, following commands, moving all four extremities spontaneously  Psych: appropriate mood

## 2020-12-22 NOTE — ED PROVIDER NOTE - ATTENDING CONTRIBUTION TO CARE
I supervised care.  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 didn't take any pain meds. not on acid suppressors. today noticed some epigastric pain. no radiation to back. no urinary sx, nausea, no vomiting blood no dark stools. denies hx of kidney stones.  no fever chills, cough, sorethroat.  exam notable for mildly uncomfortable no distress  clear lungs, S1-2, soft non distended abd, BS+  no peripheral edema  will do symptoms control, as likely gastritis exacerbated by antibiotics. ACS rule out. CXR.   if symptoms controlled, will go home with GI follow up.

## 2020-12-22 NOTE — ED PROVIDER NOTE - PATIENT PORTAL LINK FT
You can access the FollowMyHealth Patient Portal offered by  by registering at the following website: [email or web address]. By joining [facility]’s FollowMyHealth portal, you will also be able to view your health information using other applications (apps) compatible with our system.

## 2020-12-22 NOTE — ED PROVIDER NOTE - PROGRESS NOTE DETAILS
Ford, PGY2 - pt reassessed, states feels much better, abd pain improved from arrival. discussed results with pt, plan for dc home, f/u GI. upreg negative.

## 2020-12-22 NOTE — ED PROVIDER NOTE - OBJECTIVE STATEMENT
44 y/o F no sig PMH presents to the ED with c/o intermittent sharp/burning epigastric pain, worse today radiating into her chest associated with nausea and several episodes of vomiting. CP not exertional or pleuritic, denies palpitations, SOB, orthopnea. Pt 6 days into course of flagyl for BV. No f/c. Denies cough, congestion, diarrhea, dysuria, hematuria.

## 2020-12-22 NOTE — ED ADULT NURSE NOTE - PMH
Medications reviewed  Duplication of therapy:  mobic & celebrex RN to clarify if he has been taking one, or both or none.  Pt's phone is out of service at this time.  VIOLET RN to clarify        Kathie Araujo, XeniaD       Borderline hypercholesterolemia  Diet-managed  Intraductal papilloma of breast, right  dx: 12/2017  Migraine    Multiparity

## 2020-12-22 NOTE — ED PROVIDER NOTE - CLINICAL SUMMARY MEDICAL DECISION MAKING FREE TEXT BOX
44 y/o F no sig PMH presents to the ED with c/o intermittent sharp/burning epigastric pain, worse today radiating into her chest associated with nausea and several episodes of vomiting. CP not exertional or pleuritic, denies palpitations, SOB, orthopnea. Pt 6 days into course of flagyl for BV. ddx medication side effect (most likely), also will r/o ACS, tx for GERD, cxr for pna.

## 2020-12-22 NOTE — ED PROVIDER NOTE - NSFOLLOWUPCLINICS_GEN_ALL_ED_FT
A Gastroenterologist  Gastroenterology  .  [place]   Phone:   Fax:   Follow Up Time:     [facility] - Primary Care  Primary Care  [address] [place] [address]  Phone: [phone]  Fax:   Follow Up Time:     [facility]astroenter[facility]  Gastroenterology  [address], [address]  [place] [address]  Phone: [phone]  Fax:   Follow Up Time:

## 2020-12-23 VITALS
HEART RATE: 86 BPM | OXYGEN SATURATION: 100 % | TEMPERATURE: 98 F | DIASTOLIC BLOOD PRESSURE: 82 MMHG | RESPIRATION RATE: 17 BRPM | SYSTOLIC BLOOD PRESSURE: 145 MMHG

## 2021-01-04 ENCOUNTER — APPOINTMENT (OUTPATIENT)
Dept: GASTROENTEROLOGY | Facility: CLINIC | Age: 46
End: 2021-01-04
Payer: MEDICAID

## 2021-01-04 VITALS
OXYGEN SATURATION: 98 % | HEART RATE: 94 BPM | SYSTOLIC BLOOD PRESSURE: 106 MMHG | HEIGHT: 62 IN | DIASTOLIC BLOOD PRESSURE: 70 MMHG | WEIGHT: 135 LBS | BODY MASS INDEX: 24.84 KG/M2 | TEMPERATURE: 98.2 F

## 2021-01-04 DIAGNOSIS — R10.13 EPIGASTRIC PAIN: ICD-10-CM

## 2021-01-04 DIAGNOSIS — Z80.0 FAMILY HISTORY OF MALIGNANT NEOPLASM OF DIGESTIVE ORGANS: ICD-10-CM

## 2021-01-04 DIAGNOSIS — R63.4 ABNORMAL WEIGHT LOSS: ICD-10-CM

## 2021-01-04 PROCEDURE — 99203 OFFICE O/P NEW LOW 30 MIN: CPT

## 2021-01-04 PROCEDURE — 99072 ADDL SUPL MATRL&STAF TM PHE: CPT

## 2021-01-04 RX ORDER — OXYCODONE AND ACETAMINOPHEN 5; 325 MG/1; MG/1
5-325 TABLET ORAL
Qty: 25 | Refills: 0 | Status: COMPLETED | COMMUNITY
Start: 2018-02-14 | End: 2021-01-04

## 2021-01-04 NOTE — PHYSICAL EXAM
[General Appearance - Alert] : alert [General Appearance - In No Acute Distress] : in no acute distress [Sclera] : the sclera and conjunctiva were normal [Neck Appearance] : the appearance of the neck was normal [] : no respiratory distress [Exaggerated Use Of Accessory Muscles For Inspiration] : no accessory muscle use [Auscultation Breath Sounds / Voice Sounds] : lungs were clear to auscultation bilaterally [Edema] : there was no peripheral edema [Bowel Sounds] : normal bowel sounds [Abdomen Soft] : soft [Abdomen Mass (___ Cm)] : no abdominal mass palpated [FreeTextEntry1] : mild epigastric/RUQ tenderness on deep palpation [No Spinal Tenderness] : no spinal tenderness [Involuntary Movements] : no involuntary movements were seen [Skin Color & Pigmentation] : normal skin color and pigmentation [No Focal Deficits] : no focal deficits [Oriented To Time, Place, And Person] : oriented to person, place, and time [Affect] : the affect was normal [Mood] : the mood was normal

## 2021-01-04 NOTE — ASSESSMENT
[FreeTextEntry1] : Impression:\par 1. Epigastric pain - differential includes PUD, biliary disease, gastric malignancy given recent weight loss\par 2. Weight loss - rule out gastrointestinal malignancies\par \par Plan:\par -Will start PPI once daily; advised patient to take it once a day in the morning\par -Check abdominal sonogram\par -Will schedule for EGD and colonoscopy for evaluation of the pain and abnormal weight loss; risks/benefits including risks of bleeding, infection, missed lesions, perforation explained. She is agreeable to procedures\par -Asked patient to avoid NSAIDs; take acetaminophen instead as needed\par \par RTC after EGD/colonoscopy

## 2021-01-04 NOTE — HISTORY OF PRESENT ILLNESS
[Heartburn] : denies heartburn [Diarrhea] : denies diarrhea [Constipation] : denies constipation [Yellow Skin Or Eyes (Jaundice)] : denies jaundice [Abdominal Swelling] : denies abdominal swelling [Wt Loss ___ Lbs] : recent [unfilled] ~Upound(s) weight loss [Nausea] : nausea [Vomiting] : vomiting [Abdominal Pain] : abdominal pain [de-identified] : This is a 45 year old female with right breast intraductal papilloma s/p lumpectomy, who presents for evaluation of epigastric pain, weight loss.\par \par The patient has been having epigastric pain for the last 4-5 months. The pain is in the epigastrium and does not radiate. It is severe and sharp/achy. It lasts a few hours at a time and occurs more at night. I was bad enough for her to go to the ER last week. She reports intermittent nausea/vomiting with the pain. It is somewhat worse with eating. She has had night time awakening from it, and reports a 15 lb weight loss since onset of the pain. She was taking intermittent Excedrin for headaches, but has stopped in the last week. She reports normal bowel movements, formed without melena/hematochezia. She has not had previous EGD or colonoscopy. Her mother's brother had gastric cancer, but no other malignancy in her first degree family or otherwise. She denies any dysphagia or odynophagia.She has been taking Pepcid and modifying her diet to eat healthier foods, with some improvement in the pain.\par \par 12/22/20 WBC 11, hgb 12.4, hct 39.3, \par Na 139, K 3.7, Cl 104, Bicarb 22, BUn 15, Cr 0.69, Glucose 112, Ca 10, T protein 7.9, Albumin 4.8, T bili 0.2, ALP 73, AST 19, ALT 17\par Lipase 39

## 2021-01-18 DIAGNOSIS — Z01.818 ENCOUNTER FOR OTHER PREPROCEDURAL EXAMINATION: ICD-10-CM

## 2021-01-19 ENCOUNTER — APPOINTMENT (OUTPATIENT)
Dept: DISASTER EMERGENCY | Facility: CLINIC | Age: 46
End: 2021-01-19

## 2021-01-20 LAB — SARS-COV-2 N GENE NPH QL NAA+PROBE: NOT DETECTED

## 2021-01-22 ENCOUNTER — RESULT REVIEW (OUTPATIENT)
Age: 46
End: 2021-01-22

## 2021-01-22 ENCOUNTER — APPOINTMENT (OUTPATIENT)
Dept: GASTROENTEROLOGY | Facility: HOSPITAL | Age: 46
End: 2021-01-22

## 2021-01-22 ENCOUNTER — OUTPATIENT (OUTPATIENT)
Dept: OUTPATIENT SERVICES | Facility: HOSPITAL | Age: 46
LOS: 1 days | End: 2021-01-22
Payer: COMMERCIAL

## 2021-01-22 VITALS
WEIGHT: 134.04 LBS | SYSTOLIC BLOOD PRESSURE: 100 MMHG | HEART RATE: 86 BPM | HEIGHT: 62 IN | DIASTOLIC BLOOD PRESSURE: 53 MMHG | TEMPERATURE: 98 F | RESPIRATION RATE: 20 BRPM | OXYGEN SATURATION: 100 %

## 2021-01-22 VITALS
SYSTOLIC BLOOD PRESSURE: 102 MMHG | OXYGEN SATURATION: 100 % | HEART RATE: 77 BPM | RESPIRATION RATE: 20 BRPM | DIASTOLIC BLOOD PRESSURE: 61 MMHG

## 2021-01-22 DIAGNOSIS — R10.13 EPIGASTRIC PAIN: ICD-10-CM

## 2021-01-22 DIAGNOSIS — Z98.890 OTHER SPECIFIED POSTPROCEDURAL STATES: Chronic | ICD-10-CM

## 2021-01-22 PROCEDURE — 88305 TISSUE EXAM BY PATHOLOGIST: CPT | Mod: 26

## 2021-01-22 PROCEDURE — 43239 EGD BIOPSY SINGLE/MULTIPLE: CPT

## 2021-01-22 PROCEDURE — 88312 SPECIAL STAINS GROUP 1: CPT | Mod: 26

## 2021-01-22 PROCEDURE — 45378 DIAGNOSTIC COLONOSCOPY: CPT | Mod: GC

## 2021-01-22 PROCEDURE — 88312 SPECIAL STAINS GROUP 1: CPT

## 2021-01-22 PROCEDURE — 43239 EGD BIOPSY SINGLE/MULTIPLE: CPT | Mod: GC

## 2021-01-22 PROCEDURE — G0121: CPT

## 2021-01-22 PROCEDURE — 88305 TISSUE EXAM BY PATHOLOGIST: CPT

## 2021-01-22 RX ORDER — SODIUM CHLORIDE 9 MG/ML
500 INJECTION INTRAMUSCULAR; INTRAVENOUS; SUBCUTANEOUS
Refills: 0 | Status: COMPLETED | OUTPATIENT
Start: 2021-01-22 | End: 2021-01-22

## 2021-01-22 RX ORDER — FAMOTIDINE 10 MG/ML
0 INJECTION INTRAVENOUS
Qty: 0 | Refills: 0 | DISCHARGE

## 2021-01-22 RX ADMIN — SODIUM CHLORIDE 30 MILLILITER(S): 9 INJECTION INTRAMUSCULAR; INTRAVENOUS; SUBCUTANEOUS at 12:05

## 2021-01-22 NOTE — PRE PROCEDURE NOTE - PRE PROCEDURE EVALUATION
Attending Physician: Manjeet Simon MD    Procedure: EGd/colonoscopy    Indication for Procedure: Weight loss, epigastric pain  ________________________________________________________  PAST MEDICAL & SURGICAL HISTORY:  Intraductal papilloma of breast, right  dx: 12/2017    Borderline hypercholesterolemia  Diet-managed    Multiparity    Migraine    H/O right breast biopsy  12/2017    dx: Intraductal Papilloma      ALLERGIES:  No Known Allergies    HOME MEDICATIONS:  pantoprazole 40 mg oral delayed release tablet: 1 tab(s) orally once a day    AICD/PPM: [ ] yes   [X ] no    PERTINENT LAB DATA:                      PHYSICAL EXAMINATION:    Height (cm): 157.5  Weight (kg): 60.8  BMI (kg/m2): 24.5  BSA (m2): 1.61T(C): 36.6  HR: 86  BP: 100/53  RR: 20  SpO2: 100%    Constitutional: NAD  HEENT: PERRLA, EOMI,    Neck:  No JVD  Respiratory: CTAB/L  Cardiovascular: S1 and S2  Gastrointestinal: BS+, soft, NT/ND  Extremities: No peripheral edema  Neurological: A/O x 3, no focal deficits  Psychiatric: Normal mood, normal affect  Skin: No rashes    ASA Class: I [ ]  II [ X]  III [ ]  IV [ ]    COMMENTS:    The patient is a suitable candidate for the planned procedure unless box checked [ ]  No, explain:

## 2021-01-26 LAB — SURGICAL PATHOLOGY STUDY: SIGNIFICANT CHANGE UP

## 2021-02-08 ENCOUNTER — OUTPATIENT (OUTPATIENT)
Dept: OUTPATIENT SERVICES | Facility: HOSPITAL | Age: 46
LOS: 1 days | End: 2021-02-08
Payer: COMMERCIAL

## 2021-02-08 ENCOUNTER — APPOINTMENT (OUTPATIENT)
Dept: ULTRASOUND IMAGING | Facility: CLINIC | Age: 46
End: 2021-02-08
Payer: COMMERCIAL

## 2021-02-08 DIAGNOSIS — R10.13 EPIGASTRIC PAIN: ICD-10-CM

## 2021-02-08 DIAGNOSIS — Z98.890 OTHER SPECIFIED POSTPROCEDURAL STATES: Chronic | ICD-10-CM

## 2021-02-08 PROCEDURE — 76700 US EXAM ABDOM COMPLETE: CPT | Mod: 26

## 2021-02-08 PROCEDURE — 76700 US EXAM ABDOM COMPLETE: CPT

## 2021-02-18 ENCOUNTER — NON-APPOINTMENT (OUTPATIENT)
Age: 46
End: 2021-02-18

## 2021-02-24 RX ORDER — POLYETHYLENE GLYCOL-3350 AND ELECTROLYTES WITH FLAVOR PACK 240; 5.84; 2.98; 6.72; 22.72 G/278.26G; G/278.26G; G/278.26G; G/278.26G; G/278.26G
240 POWDER, FOR SOLUTION ORAL
Qty: 1 | Refills: 0 | Status: DISCONTINUED | COMMUNITY
Start: 2021-01-04 | End: 2021-02-24

## 2021-02-25 ENCOUNTER — APPOINTMENT (OUTPATIENT)
Dept: SURGERY | Facility: CLINIC | Age: 46
End: 2021-02-25
Payer: COMMERCIAL

## 2021-02-25 ENCOUNTER — APPOINTMENT (OUTPATIENT)
Dept: OBGYN | Facility: CLINIC | Age: 46
End: 2021-02-25

## 2021-02-25 VITALS
SYSTOLIC BLOOD PRESSURE: 117 MMHG | HEART RATE: 89 BPM | HEIGHT: 62 IN | RESPIRATION RATE: 16 BRPM | BODY MASS INDEX: 24.66 KG/M2 | TEMPERATURE: 97 F | OXYGEN SATURATION: 98 % | WEIGHT: 134 LBS | DIASTOLIC BLOOD PRESSURE: 71 MMHG

## 2021-02-25 DIAGNOSIS — K80.20 CALCULUS OF GALLBLADDER W/OUT CHOLECYSTITIS W/OUT OBSTRUCTION: ICD-10-CM

## 2021-02-25 DIAGNOSIS — Z63.5 DISRUPTION OF FAMILY BY SEPARATION AND DIVORCE: ICD-10-CM

## 2021-02-25 PROCEDURE — 99072 ADDL SUPL MATRL&STAF TM PHE: CPT

## 2021-02-25 PROCEDURE — 99204 OFFICE O/P NEW MOD 45 MIN: CPT

## 2021-02-25 RX ORDER — OMEPRAZOLE 20 MG/1
20 TABLET, DELAYED RELEASE ORAL
Qty: 30 | Refills: 2 | Status: DISCONTINUED | COMMUNITY
Start: 2021-01-04 | End: 2021-02-25

## 2021-02-25 RX ORDER — METRONIDAZOLE 500 MG/1
500 TABLET ORAL TWICE DAILY
Qty: 14 | Refills: 0 | Status: DISCONTINUED | COMMUNITY
Start: 2020-12-14 | End: 2021-02-25

## 2021-02-25 RX ORDER — MV,IRON,MINS/FOLIC ACID/BIOTIN 66.7 MCG
CAPSULE ORAL
Refills: 0 | Status: ACTIVE | COMMUNITY

## 2021-02-25 RX ORDER — METRONIDAZOLE 500 MG/1
500 TABLET ORAL
Qty: 14 | Refills: 0 | Status: COMPLETED | COMMUNITY
Start: 2017-12-13 | End: 2021-02-25

## 2021-02-25 RX ORDER — METRONIDAZOLE 500 MG/1
500 TABLET ORAL
Qty: 8 | Refills: 0 | Status: DISCONTINUED | COMMUNITY
Start: 2020-09-23 | End: 2021-02-25

## 2021-02-25 SDOH — SOCIAL STABILITY - SOCIAL INSECURITY: DISRUPTION OF FAMILY BY SEPARATION AND DIVORCE: Z63.5

## 2021-02-25 NOTE — REVIEW OF SYSTEMS
[As Noted in HPI] : as noted in HPI [Negative] : Heme/Lymph [Abdominal Pain] : abdominal pain [Vomiting] : vomiting

## 2021-02-25 NOTE — ASSESSMENT
[FreeTextEntry1] : This patient is suffering from biliary colic.  I have offered her a laparoscopic cholecystectomy.  The procedure as well as risks(including, but not limited to bleeding, infection, injury to hollow viscus, injury to bile ducts), benefits (pain relief), and alternatives were explained to the patient.\par \par We will be scheduling this procedure in the upcoming weeks.

## 2021-02-25 NOTE — PHYSICAL EXAM
[Normal Breath Sounds] : Normal breath sounds [Normal Heart Sounds] : normal heart sounds [No Rash or Lesion] : No rash or lesion [Calm] : calm [de-identified] : No acute distress [de-identified] : Sclera clear [de-identified] : Supple without adenopathy [de-identified] : Soft nontender and nondistended;  there are no masses and there is no Jacobs's sign [de-identified] : No clubbing cyanosis or edema [de-identified] : Cranial nerves II through XII grossly intact

## 2021-02-25 NOTE — CONSULT LETTER
[Dear  ___] : Dear  [unfilled], [Consult Letter:] : I had the pleasure of evaluating your patient, [unfilled]. [Please see my note below.] : Please see my note below. [Sincerely,] : Sincerely, [FreeTextEntry3] : Zacarias Staton MD, FACS\par [facility]\par [facility]\par [address]\par [place]  [address]\par Tel: [phone]\par Email: [email or web address]\par \par

## 2021-02-25 NOTE — REASON FOR VISIT
[Initial Evaluation] : an initial evaluation [FreeTextEntry1] : US 2/8/21 - 3.4 cm gallstone and sludge

## 2021-02-25 NOTE — HISTORY OF PRESENT ILLNESS
[de-identified] : This 45-year-old woman planes of intermittent episodes of severe epigastric pain radiating to the right flank and right back for approximately 1 year.  These episodes have become more frequent and more severe in the last 2 months.  These painful episodes are frequently accompanied by nausea and emesis.  She denies fever or chills.  She complains of fatty food intolerance.

## 2021-03-03 ENCOUNTER — OUTPATIENT (OUTPATIENT)
Dept: OUTPATIENT SERVICES | Facility: HOSPITAL | Age: 46
LOS: 1 days | End: 2021-03-03
Payer: COMMERCIAL

## 2021-03-03 VITALS
DIASTOLIC BLOOD PRESSURE: 71 MMHG | RESPIRATION RATE: 18 BRPM | WEIGHT: 134.04 LBS | HEIGHT: 62 IN | HEART RATE: 78 BPM | SYSTOLIC BLOOD PRESSURE: 109 MMHG | OXYGEN SATURATION: 97 % | TEMPERATURE: 99 F

## 2021-03-03 DIAGNOSIS — K80.20 CALCULUS OF GALLBLADDER WITHOUT CHOLECYSTITIS WITHOUT OBSTRUCTION: ICD-10-CM

## 2021-03-03 DIAGNOSIS — Z01.818 ENCOUNTER FOR OTHER PREPROCEDURAL EXAMINATION: ICD-10-CM

## 2021-03-03 DIAGNOSIS — Z98.890 OTHER SPECIFIED POSTPROCEDURAL STATES: Chronic | ICD-10-CM

## 2021-03-03 PROCEDURE — 85027 COMPLETE CBC AUTOMATED: CPT

## 2021-03-03 PROCEDURE — G0463: CPT

## 2021-03-03 PROCEDURE — 80053 COMPREHEN METABOLIC PANEL: CPT

## 2021-03-03 RX ORDER — PANTOPRAZOLE SODIUM 20 MG/1
1 TABLET, DELAYED RELEASE ORAL
Qty: 0 | Refills: 0 | DISCHARGE

## 2021-03-03 RX ORDER — SODIUM CHLORIDE 9 MG/ML
3 INJECTION INTRAMUSCULAR; INTRAVENOUS; SUBCUTANEOUS EVERY 8 HOURS
Refills: 0 | Status: DISCONTINUED | OUTPATIENT
Start: 2021-03-08 | End: 2021-03-22

## 2021-03-03 RX ORDER — LIDOCAINE HCL 20 MG/ML
0.2 VIAL (ML) INJECTION ONCE
Refills: 0 | Status: DISCONTINUED | OUTPATIENT
Start: 2021-03-08 | End: 2021-03-22

## 2021-03-03 NOTE — H&P PST ADULT - HISTORY OF PRESENT ILLNESS
45yr old female with gallstones had few episodes of pain and vomiting  and one visit to ER. Workup showed stones in gallbladder.  Now coming in for laparoscopic cholecystectomy. Pt denies recent  covid symptoms and no recent contacts.    Note covid test 3/5/21 Jonathan

## 2021-03-03 NOTE — H&P PST ADULT - NSANTHSNORERD_ENT_A_CORE
Patient is applying for Social Security disability and needing medical records sent to the Examiner at the Disability office. Transferred patient to Medical records. No

## 2021-03-03 NOTE — H&P PST ADULT - NSICDXPROBLEM_GEN_ALL_CORE_FT
PROBLEM DIAGNOSES  Problem: Gallbladder calculus without cholecystitis and no obstruction  Assessment and Plan: coming in for laparoscopic cholecystectomy

## 2021-03-03 NOTE — H&P PST ADULT - NSICDXPASTMEDICALHX_GEN_ALL_CORE_FT
PAST MEDICAL HISTORY:  Abnormal uterine bleeding due to endometrial polyp     Borderline hypercholesterolemia Diet-managed    Intraductal papilloma of breast, right dx: 12/2017    Migraine

## 2021-03-05 ENCOUNTER — OUTPATIENT (OUTPATIENT)
Dept: OUTPATIENT SERVICES | Facility: HOSPITAL | Age: 46
LOS: 1 days | End: 2021-03-05
Payer: COMMERCIAL

## 2021-03-05 DIAGNOSIS — Z11.52 ENCOUNTER FOR SCREENING FOR COVID-19: ICD-10-CM

## 2021-03-05 DIAGNOSIS — Z98.890 OTHER SPECIFIED POSTPROCEDURAL STATES: Chronic | ICD-10-CM

## 2021-03-05 LAB — SARS-COV-2 RNA SPEC QL NAA+PROBE: SIGNIFICANT CHANGE UP

## 2021-03-05 PROCEDURE — U0005: CPT

## 2021-03-05 PROCEDURE — U0003: CPT

## 2021-03-05 PROCEDURE — C9803: CPT

## 2021-03-07 ENCOUNTER — TRANSCRIPTION ENCOUNTER (OUTPATIENT)
Age: 46
End: 2021-03-07

## 2021-03-08 ENCOUNTER — RESULT REVIEW (OUTPATIENT)
Age: 46
End: 2021-03-08

## 2021-03-08 ENCOUNTER — OUTPATIENT (OUTPATIENT)
Dept: OUTPATIENT SERVICES | Facility: HOSPITAL | Age: 46
LOS: 1 days | End: 2021-03-08
Payer: COMMERCIAL

## 2021-03-08 ENCOUNTER — APPOINTMENT (OUTPATIENT)
Dept: SURGERY | Facility: HOSPITAL | Age: 46
End: 2021-03-08
Payer: COMMERCIAL

## 2021-03-08 VITALS
RESPIRATION RATE: 18 BRPM | HEART RATE: 78 BPM | DIASTOLIC BLOOD PRESSURE: 71 MMHG | OXYGEN SATURATION: 97 % | SYSTOLIC BLOOD PRESSURE: 109 MMHG

## 2021-03-08 VITALS — HEIGHT: 62 IN | WEIGHT: 134.04 LBS

## 2021-03-08 VITALS
OXYGEN SATURATION: 99 % | HEART RATE: 84 BPM | RESPIRATION RATE: 14 BRPM | DIASTOLIC BLOOD PRESSURE: 77 MMHG | SYSTOLIC BLOOD PRESSURE: 110 MMHG

## 2021-03-08 DIAGNOSIS — K80.20 CALCULUS OF GALLBLADDER WITHOUT CHOLECYSTITIS WITHOUT OBSTRUCTION: ICD-10-CM

## 2021-03-08 DIAGNOSIS — Z98.890 OTHER SPECIFIED POSTPROCEDURAL STATES: Chronic | ICD-10-CM

## 2021-03-08 PROCEDURE — 47563 LAPARO CHOLECYSTECTOMY/GRAPH: CPT | Mod: 82

## 2021-03-08 PROCEDURE — 47563 LAPARO CHOLECYSTECTOMY/GRAPH: CPT

## 2021-03-08 PROCEDURE — C1889: CPT

## 2021-03-08 PROCEDURE — 88304 TISSUE EXAM BY PATHOLOGIST: CPT

## 2021-03-08 PROCEDURE — 88304 TISSUE EXAM BY PATHOLOGIST: CPT | Mod: 26

## 2021-03-08 PROCEDURE — C9399: CPT

## 2021-03-08 RX ORDER — CEFOTETAN DISODIUM 1 G
2 VIAL (EA) INJECTION ONCE
Refills: 0 | Status: COMPLETED | OUTPATIENT
Start: 2021-03-08 | End: 2021-03-08

## 2021-03-08 RX ORDER — HYDROMORPHONE HYDROCHLORIDE 2 MG/ML
0.5 INJECTION INTRAMUSCULAR; INTRAVENOUS; SUBCUTANEOUS
Refills: 0 | Status: DISCONTINUED | OUTPATIENT
Start: 2021-03-08 | End: 2021-03-08

## 2021-03-08 RX ORDER — CHLORHEXIDINE GLUCONATE 213 G/1000ML
1 SOLUTION TOPICAL ONCE
Refills: 0 | Status: COMPLETED | OUTPATIENT
Start: 2021-03-08 | End: 2021-03-08

## 2021-03-08 RX ORDER — OXYCODONE HYDROCHLORIDE 5 MG/1
1 TABLET ORAL
Qty: 8 | Refills: 0
Start: 2021-03-08

## 2021-03-08 RX ORDER — ACETAMINOPHEN 500 MG
1000 TABLET ORAL ONCE
Refills: 0 | Status: COMPLETED | OUTPATIENT
Start: 2021-03-08 | End: 2021-03-08

## 2021-03-08 RX ORDER — ONDANSETRON 8 MG/1
4 TABLET, FILM COATED ORAL ONCE
Refills: 0 | Status: COMPLETED | OUTPATIENT
Start: 2021-03-08 | End: 2021-03-08

## 2021-03-08 RX ORDER — ACETAMINOPHEN 500 MG
1000 TABLET ORAL ONCE
Refills: 0 | Status: DISCONTINUED | OUTPATIENT
Start: 2021-03-08 | End: 2021-03-18

## 2021-03-08 RX ORDER — SODIUM CHLORIDE 9 MG/ML
1000 INJECTION, SOLUTION INTRAVENOUS
Refills: 0 | Status: DISCONTINUED | OUTPATIENT
Start: 2021-03-08 | End: 2021-03-18

## 2021-03-08 RX ORDER — ONDANSETRON 8 MG/1
4 TABLET, FILM COATED ORAL ONCE
Refills: 0 | Status: DISCONTINUED | OUTPATIENT
Start: 2021-03-08 | End: 2021-03-18

## 2021-03-08 RX ORDER — OXYCODONE HYDROCHLORIDE 5 MG/1
5 TABLET ORAL ONCE
Refills: 0 | Status: DISCONTINUED | OUTPATIENT
Start: 2021-03-08 | End: 2021-03-08

## 2021-03-08 RX ORDER — FENTANYL CITRATE 50 UG/ML
25 INJECTION INTRAVENOUS
Refills: 0 | Status: DISCONTINUED | OUTPATIENT
Start: 2021-03-08 | End: 2021-03-08

## 2021-03-08 RX ORDER — SODIUM CHLORIDE 9 MG/ML
1000 INJECTION, SOLUTION INTRAVENOUS
Refills: 0 | Status: DISCONTINUED | OUTPATIENT
Start: 2021-03-08 | End: 2021-03-22

## 2021-03-08 RX ADMIN — Medication 400 MILLIGRAM(S): at 12:38

## 2021-03-08 RX ADMIN — CHLORHEXIDINE GLUCONATE 1 APPLICATION(S): 213 SOLUTION TOPICAL at 08:50

## 2021-03-08 RX ADMIN — ONDANSETRON 4 MILLIGRAM(S): 8 TABLET, FILM COATED ORAL at 13:37

## 2021-03-08 RX ADMIN — HYDROMORPHONE HYDROCHLORIDE 0.5 MILLIGRAM(S): 2 INJECTION INTRAMUSCULAR; INTRAVENOUS; SUBCUTANEOUS at 13:37

## 2021-03-08 NOTE — BRIEF OPERATIVE NOTE - NSICDXBRIEFPROCEDURE_GEN_ALL_CORE_FT
PROCEDURES:  Laparoscopic cholecystectomy using multiple ports 08-Mar-2021 12:42:05  Lazaro Stevens

## 2021-03-08 NOTE — ASU DISCHARGE PLAN (ADULT/PEDIATRIC) - CALL YOUR DOCTOR IF YOU HAVE ANY OF THE FOLLOWING:
Bleeding that does not stop/Fever greater than (need to indicate Fahrenheit or Celsius)/Wound/Surgical Site with redness, or foul smelling discharge or pus/Nausea and vomiting that does not stop/Inability to tolerate liquids or foods

## 2021-03-08 NOTE — ASU DISCHARGE PLAN (ADULT/PEDIATRIC) - CARE PROVIDER_API CALL
Zacarias Mariano)  Surgery  [address], [address]  [place] [address]  Phone: [phone]  Fax: [phone]  Follow Up Time:

## 2021-03-08 NOTE — ASU PATIENT PROFILE, ADULT - PMH
Abnormal uterine bleeding due to endometrial polyp    Borderline hypercholesterolemia  Diet-managed  Intraductal papilloma of breast, right  dx: 12/2017  Migraine

## 2021-03-08 NOTE — BRIEF OPERATIVE NOTE - OPERATION/FINDINGS
laparoscopic cholecystectomy in routine manner, critical view of safety achieved, cystic and CBD locations verified with ICG. Duct divided after hemolocks applied x 3. Hemostatic at the end of the case.

## 2021-03-08 NOTE — ASU PATIENT PROFILE, ADULT - PRO ARRIVE FROM

## 2021-03-10 PROBLEM — N93.9 ABNORMAL UTERINE AND VAGINAL BLEEDING, UNSPECIFIED: Chronic | Status: ACTIVE | Noted: 2021-03-03

## 2021-03-18 LAB — SURGICAL PATHOLOGY STUDY: SIGNIFICANT CHANGE UP

## 2021-03-25 ENCOUNTER — APPOINTMENT (OUTPATIENT)
Dept: SURGERY | Facility: CLINIC | Age: 46
End: 2021-03-25
Payer: COMMERCIAL

## 2021-03-25 DIAGNOSIS — K82.8 OTHER SPECIFIED DISEASES OF GALLBLADDER: ICD-10-CM

## 2021-03-25 PROCEDURE — 99024 POSTOP FOLLOW-UP VISIT: CPT

## 2021-03-25 NOTE — REASON FOR VISIT
[Post Op: _________] : a [unfilled] post op visit [FreeTextEntry1] : Laparoscopic  cholecystectomy with indocyanine green ductal imaging.

## 2021-05-17 ENCOUNTER — APPOINTMENT (OUTPATIENT)
Dept: GASTROENTEROLOGY | Facility: CLINIC | Age: 46
End: 2021-05-17

## 2021-10-17 ENCOUNTER — LABORATORY RESULT (OUTPATIENT)
Age: 46
End: 2021-10-17

## 2021-10-18 ENCOUNTER — APPOINTMENT (OUTPATIENT)
Dept: OBGYN | Facility: CLINIC | Age: 46
End: 2021-10-18
Payer: COMMERCIAL

## 2021-10-18 ENCOUNTER — RESULT CHARGE (OUTPATIENT)
Age: 46
End: 2021-10-18

## 2021-10-18 ENCOUNTER — OUTPATIENT (OUTPATIENT)
Dept: OUTPATIENT SERVICES | Facility: HOSPITAL | Age: 46
LOS: 1 days | End: 2021-10-18
Payer: COMMERCIAL

## 2021-10-18 VITALS — BODY MASS INDEX: 25.97 KG/M2 | WEIGHT: 142 LBS | DIASTOLIC BLOOD PRESSURE: 80 MMHG | SYSTOLIC BLOOD PRESSURE: 130 MMHG

## 2021-10-18 DIAGNOSIS — Z98.890 OTHER SPECIFIED POSTPROCEDURAL STATES: Chronic | ICD-10-CM

## 2021-10-18 DIAGNOSIS — N76.0 ACUTE VAGINITIS: ICD-10-CM

## 2021-10-18 DIAGNOSIS — A59.9 TRICHOMONIASIS, UNSPECIFIED: ICD-10-CM

## 2021-10-18 DIAGNOSIS — N84.0 POLYP OF CORPUS UTERI: ICD-10-CM

## 2021-10-18 DIAGNOSIS — N92.0 EXCESSIVE AND FREQUENT MENSTRUATION WITH REGULAR CYCLE: ICD-10-CM

## 2021-10-18 LAB
BILIRUB UR QL STRIP: NORMAL
GLUCOSE UR-MCNC: NORMAL
HCG UR QL: 0.2 EU/DL
HGB UR QL STRIP.AUTO: NORMAL
KETONES UR-MCNC: NORMAL
LEUKOCYTE ESTERASE UR QL STRIP: NORMAL
NITRITE UR QL STRIP: NORMAL
PH UR STRIP: 7
PROT UR STRIP-MCNC: NORMAL
SP GR UR STRIP: 1.02

## 2021-10-18 PROCEDURE — G0463: CPT

## 2021-10-18 PROCEDURE — 99213 OFFICE O/P EST LOW 20 MIN: CPT

## 2021-10-18 NOTE — PHYSICAL EXAM
[Appropriately responsive] : appropriately responsive [Alert] : alert [No Acute Distress] : no acute distress [No Lymphadenopathy] : no lymphadenopathy [Regular Rate Rhythm] : regular rate rhythm [No Murmurs] : no murmurs [Clear to Auscultation B/L] : clear to auscultation bilaterally [Soft] : soft [Non-tender] : non-tender [Non-distended] : non-distended [No HSM] : No HSM [No Lesions] : no lesions [No Mass] : no mass [Oriented x3] : oriented x3 [Examination Of The Breasts] : a normal appearance [No Masses] : no breast masses were palpable [Labia Majora] : normal [Labia Minora] : normal [Normal] : normal [Uterine Adnexae] : normal [Declined] : Patient declined rectal exam [Tenderness] : nontender [Mass ___ cm] : no uterine mass was palpated

## 2021-10-18 NOTE — HISTORY OF PRESENT ILLNESS
[Patient reported mammogram was normal] : Patient reported mammogram was normal [Patient reported PAP Smear was normal] : Patient reported PAP Smear was normal [Patient reported colonoscopy was normal] : Patient reported colonoscopy was normal [HIV test declined] : HIV test declined [Syphilis test declined] : Syphilis test declined [Gonorrhea test offered] : Gonorrhea test offered [Chlamydia test offered] : Chlamydia test offered [Trichomonas test declined] : Trichomonas test declined [HPV test declined] : HPV test declined [Hepatitis B test declined] : Hepatitis B test declined [Hepatitis C test declined] : Hepatitis C test declined [perimenopausal] : perimenopausal [Y] : Positive pregnancy history [Heavy Bleeding] : heavy bleeding [Pain] : pain [Currently Active] : currently active [Men] : men [Vaginal] : vaginal [No] : No [Yes] : Yes [Condoms] : Condoms [Patient would like to be screened for STIs] : Patient would like to be screened for STIs [TextBox_4] : LMP 9-30-21\par Menarche 12 y/o\par Cycles are regular at 28 days, last 5-7 days, and have been heavy flow for past 2 years.\par No intermenstrual bleeding. [[facility]] : 9-30[facility]21 [MensesFreq] : 28 [MensesLength] : 5 [MensesAmount] : Heavy [PGHxTotal] : 3 [[facility]Full[facility]] : 2 [PGHxPremature] : 0 [PGHxAbortions] : 1 [[facility] CenterxLiving] : 2 [PGHxABInduced] : 0 [PGHxABSpont] : 0 [PGHxEctopic] : 0 [PGHxMultBirths] : 0 [FreeTextEntry1] : Excess bleeding during menstruation

## 2021-10-18 NOTE — PLAN
[FreeTextEntry1] : MT is a 46 y/o , LMP 9-30-21 F- menorrhagia/ EM polyps on sonohystogram 2021\par \par #AUB\par -Consent signed for D&C polypectomy\par - task sent to reschedule \par \par #Contraception\par -Pt would like Mirina IUD inserted during D&C polypectomy\par \par #Preventative \par -STI screening offered- [ ]GC/CT [ ]affirm sent \par -Follow up with PCP recommended \par - pap utd\par - [ ]mammo refferal given\par - colonoscopy utd \par \par RTC after D&C hysteroscopy/ Mirena insertion\par Homero Schwartz, PAC\par

## 2021-10-25 DIAGNOSIS — N92.0 EXCESSIVE AND FREQUENT MENSTRUATION WITH REGULAR CYCLE: ICD-10-CM

## 2021-10-25 DIAGNOSIS — A59.9 TRICHOMONIASIS, UNSPECIFIED: ICD-10-CM

## 2021-10-25 DIAGNOSIS — N84.0 POLYP OF CORPUS UTERI: ICD-10-CM

## 2022-08-17 NOTE — ASU DISCHARGE PLAN (ADULT/PEDIATRIC) - C. MAKE IMPORTANT PERSONAL OR BUSINESS DECISIONS
Statement Selected Manual Repair Warning Statement: We plan on removing the manually selected variable below in favor of our much easier automatic structured text blocks found in the previous tab. We decided to do this to help make the flow better and give you the full power of structured data. Manual selection is never going to be ideal in our platform and I would encourage you to avoid using manual selection from this point on, especially since I will be sunsetting this feature. It is important that you do one of two things with the customized text below. First, you can save all of the text in a word file so you can have it for future reference. Second, transfer the text to the appropriate area in the Library tab. Lastly, if there is a flap or graft type which we do not have you need to let us know right away so I can add it in before the variable is hidden. No need to panic, we plan to give you roughly 6 months to make the change.

## 2022-10-20 NOTE — PRE-ANESTHESIA EVALUATION ADULT - MALLAMPATI CLASS
76y Female is under our care for right buttock abscess (s/p I&D) and MSSA bacteremia. Patient is in same disposition and remains inhouse since she is not able to afford outpatient IV infusion services. Remains afebrile with normal wbc count.     MEDS:  ceFAZolin   IVPB 1000 milliGRAM(s) IV Intermittent every 8 hours    ALLERGIES: Allergies    codeine (Rash)    Intolerances    REVIEW OF SYSTEMS:  [  ] Not able to illicit  General: no fevers no malaise  Chest: no cough no sob  GI: no nvd  : no urinary sxs   Skin: no rashes  Musculoskeletal: no trauma no LBP  Neuro: no ha's no dizziness     VITALS:  Vital Signs Last 24 Hrs  T(C): 36.2 (10-20-22 @ 05:23), Max: 36.4 (10-19-22 @ 12:33)  T(F): 97.1 (10-20-22 @ 05:23), Max: 97.6 (10-19-22 @ 12:33)  HR: 68 (10-20-22 @ 05:23) (60 - 70)  BP: 165/87 (10-20-22 @ 05:23) (119/75 - 165/87)  BP(mean): --  RR: 16 (10-20-22 @ 05:23) (16 - 16)  SpO2: 94% (10-20-22 @ 05:23) (92% - 94%)    PHYSICAL EXAM:  HEENT: n/a  Neck: supple no LN's   Respiratory: lungs clear no rales  Cardiovascular: S1 S2 reg no murmurs  Gastrointestinal: +BS with soft, nondistended abdomen; nontender  Extremities: no edema, +left arm PICC line  Skin: right buttock region is dressed  Ortho: n/a  Neuro: awake and alert    LABS/DIAGNOSTIC TESTS:  No new labs                         13.0   6.51  )-----------( 311      ( 18 Oct 2022 07:03 )             40.1     WBC Count: 6.51 K/uL (10-18 @ 07:03)  WBC Count: 7.97 K/uL (10-16 @ 07:20)  WBC Count: 9.02 K/uL (10-15 @ 07:36)    10-18    134<L>  |  101  |  21<H>  ----------------------------<  108<H>  4.2   |  27  |  0.94    Ca    9.7      18 Oct 2022 07:03        CULTURES:   .Blood Blood-Peripheral  10-09 @ 06:24   No Growth Final  --  --      .Blood Blood-Peripheral  10-09 @ 06:02   No Growth Final  --  --      .Blood Blood-Peripheral  10-06 @ 12:00   Growth in aerobic and anaerobic bottles: Staphylococcus aureus  See previous culture  [id number]  --  Blood Culture PCR  Staphylococcus aureus      .Abscess Leg - Right  10-01 @ 13:10   Numerous Staphylococcus aureus  --  Staphylococcus aureus      RADIOLOGY:  no new studies Class II - visualization of the soft palate, fauces, and uvula

## 2022-11-09 NOTE — ASU DISCHARGE PLAN (ADULT/PEDIATRIC). - INSTRUCTIONS
Spironolactone 25 mg  Filled 8-14-22  Qty 90  0 refills  No upcoming appt. LOV 10-6-22 TO    Metformin HCL 1000 mg  Filled 8-14-22  Qty 180  0 refills  No upcoming appt. LOV 10-6-22 TO  Labs: 10-6-22  A1c  04/07/22 Microalb./Creat. Jardiance 10 mg  Filled 8-14-22  Qty 90  0 refills  No upcoming appt. LOV 10-6-22 TO  Labs: 10-6-22  A1c  04/07/22 Microalb./Creat. Nortriptyline 25 mg  Filled 8-14-22  Qty 90  0 refills  No upcoming appt. LOV 10-6-22 TO    Omeprazole 20 mg  Too early for refill  Filled 11-3-22  Qty 180  3 refills    Topiramate 25 mg  Filled 5-16-22  Qty 180  1 refill  No upcoming appt. LOV 10-6-22 TO    Topiramate 50 mg  Filled 4-16-22  Qty 180  1 refill  No upcoming appt.   LOV 10-6-22 TO regular

## 2022-11-21 NOTE — ASU PREOP CHECKLIST - HEIGHT IN CM
157.48 Propranolol Counseling:  I discussed with the patient the risks of propranolol including but not limited to low heart rate, low blood pressure, low blood sugar, restlessness and increased cold sensitivity. They should call the office if they experience any of these side effects.

## 2023-05-16 ENCOUNTER — NON-APPOINTMENT (OUTPATIENT)
Age: 48
End: 2023-05-16

## 2023-10-19 ENCOUNTER — NON-APPOINTMENT (OUTPATIENT)
Age: 48
End: 2023-10-19

## 2023-10-19 ENCOUNTER — APPOINTMENT (OUTPATIENT)
Dept: INTERNAL MEDICINE | Facility: CLINIC | Age: 48
End: 2023-10-19
Payer: MEDICAID

## 2023-10-19 VITALS
BODY MASS INDEX: 26.5 KG/M2 | HEART RATE: 77 BPM | OXYGEN SATURATION: 98 % | DIASTOLIC BLOOD PRESSURE: 75 MMHG | WEIGHT: 144 LBS | SYSTOLIC BLOOD PRESSURE: 115 MMHG | HEIGHT: 62 IN | TEMPERATURE: 98.1 F

## 2023-10-19 DIAGNOSIS — E55.9 VITAMIN D DEFICIENCY, UNSPECIFIED: ICD-10-CM

## 2023-10-19 DIAGNOSIS — Z12.39 ENCOUNTER FOR OTHER SCREENING FOR MALIGNANT NEOPLASM OF BREAST: ICD-10-CM

## 2023-10-19 DIAGNOSIS — Z00.00 ENCOUNTER FOR GENERAL ADULT MEDICAL EXAMINATION W/OUT ABNORMAL FINDINGS: ICD-10-CM

## 2023-10-19 PROCEDURE — 99386 PREV VISIT NEW AGE 40-64: CPT

## 2023-10-24 LAB
25(OH)D3 SERPL-MCNC: 29.6 NG/ML
ALBUMIN SERPL ELPH-MCNC: 4.5 G/DL
ALP BLD-CCNC: 58 U/L
ALT SERPL-CCNC: 23 U/L
ANION GAP SERPL CALC-SCNC: 13 MMOL/L
AST SERPL-CCNC: 21 U/L
BASOPHILS # BLD AUTO: 0.08 K/UL
BASOPHILS NFR BLD AUTO: 1.5 %
BILIRUB SERPL-MCNC: 0.5 MG/DL
BUN SERPL-MCNC: 12 MG/DL
CALCIUM SERPL-MCNC: 9.2 MG/DL
CHLORIDE SERPL-SCNC: 105 MMOL/L
CHOLEST SERPL-MCNC: 266 MG/DL
CO2 SERPL-SCNC: 22 MMOL/L
CREAT SERPL-MCNC: 0.62 MG/DL
EGFR: 110 ML/MIN/1.73M2
EOSINOPHIL # BLD AUTO: 0.29 K/UL
EOSINOPHIL NFR BLD AUTO: 5.3 %
ESTIMATED AVERAGE GLUCOSE: 100 MG/DL
GLUCOSE SERPL-MCNC: 90 MG/DL
HBA1C MFR BLD HPLC: 5.1 %
HCT VFR BLD CALC: 41.1 %
HDLC SERPL-MCNC: 44 MG/DL
HGB BLD-MCNC: 12.9 G/DL
IMM GRANULOCYTES NFR BLD AUTO: 0.6 %
LDLC SERPL CALC-MCNC: 172 MG/DL
LYMPHOCYTES # BLD AUTO: 1.85 K/UL
LYMPHOCYTES NFR BLD AUTO: 34 %
MAN DIFF?: NORMAL
MCHC RBC-ENTMCNC: 27.4 PG
MCHC RBC-ENTMCNC: 31.4 GM/DL
MCV RBC AUTO: 87.4 FL
MONOCYTES # BLD AUTO: 0.49 K/UL
MONOCYTES NFR BLD AUTO: 9 %
NEUTROPHILS # BLD AUTO: 2.7 K/UL
NEUTROPHILS NFR BLD AUTO: 49.6 %
NONHDLC SERPL-MCNC: 222 MG/DL
PLATELET # BLD AUTO: 306 K/UL
POTASSIUM SERPL-SCNC: 4.4 MMOL/L
PROT SERPL-MCNC: 7.2 G/DL
RBC # BLD: 4.7 M/UL
RBC # FLD: 13.4 %
SODIUM SERPL-SCNC: 140 MMOL/L
TRIGL SERPL-MCNC: 265 MG/DL
TSH SERPL-ACNC: 1.7 UIU/ML
VIT B12 SERPL-MCNC: 1284 PG/ML
WBC # FLD AUTO: 5.44 K/UL

## 2023-10-31 NOTE — ED ADULT NURSE NOTE - FINAL NURSING ELECTRONIC SIGNATURE
Bed: JNED-32  Expected date:   Expected time:   Means of arrival:   Comments:  ED 10   23-Dec-2020 00:28 23-Dec-2020 00:36

## 2024-05-28 NOTE — H&P PST ADULT - NSICDXPASTSURGICALHX_GEN_ALL_CORE_FT
"Goal Outcome Evaluation:    Plan of Care Reviewed With: patient  Overall Patient Progress: no changeOverall Patient Progress: no change  /72 (BP Location: Right arm)   Pulse 50   Temp 98.8  F (37.1  C) (Oral)   Resp 18   Ht 1.626 m (5' 4.02\")   Wt 40.4 kg (89 lb)   LMP  (LMP Unknown)   SpO2 97%   BMI 15.27 kg/m     AVSS  C/o pain and oxy given x1.  On scheduled tylenol.   Turn and reposition with assist of 1-2 - last at 1030.  Pure wick in place with good uop. BM x1 at 1030.  PIV - sl after phos replacement.   No new changes. Continue with POC.         " PAST SURGICAL HISTORY:  H/O right breast biopsy 12/2017    dx: Intraductal Papilloma

## 2025-01-07 NOTE — H&P PST ADULT - NSANTHNECKRD_ENT_A_CORE
1/7/25 - noticeable hair growth is seen in areas that have been getting treated with Kenalog injections. Discussed no treatment today as to not cause any atrophy where hair is regrowing. \\nDiscussed 3 month f/u to ensure patient is seeing progress \\n\\n11/26/24 - Pt reports stopping oral minoxidil last month due to headaches and migraines that pt was experiencing. Pt states migraines did not let her go to work and made her feel fatigued. Pt will discontinue Oral Minoxidil\\n\\nSeveral month history of discrete patches of hair loss and retention of the follicular openings on the frontal scalp. She reports having an episode of patchy hair loss many years ago that resolved without treatment. 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 We will start with intralesional kenalog with close follow. We may consider punch biopsy if improvement is not noted to rule out a scarring hair loss such as FFP. \\n\\nPt states she had hyperthyroidism 5 years ago which was treated with medication for 1 years - states Thyroid levels have been normal ever since. Detail Level: Zone No